# Patient Record
Sex: FEMALE | Race: NATIVE HAWAIIAN OR OTHER PACIFIC ISLANDER | HISPANIC OR LATINO | URBAN - METROPOLITAN AREA
[De-identification: names, ages, dates, MRNs, and addresses within clinical notes are randomized per-mention and may not be internally consistent; named-entity substitution may affect disease eponyms.]

---

## 2017-05-18 ENCOUNTER — OUTPATIENT (OUTPATIENT)
Dept: OUTPATIENT SERVICES | Facility: HOSPITAL | Age: 52
LOS: 1 days | End: 2017-05-18
Payer: COMMERCIAL

## 2017-05-18 VITALS
TEMPERATURE: 98 F | WEIGHT: 123.02 LBS | HEIGHT: 62 IN | OXYGEN SATURATION: 98 % | RESPIRATION RATE: 16 BRPM | DIASTOLIC BLOOD PRESSURE: 75 MMHG | SYSTOLIC BLOOD PRESSURE: 118 MMHG | HEART RATE: 68 BPM

## 2017-05-18 DIAGNOSIS — N95.0 POSTMENOPAUSAL BLEEDING: ICD-10-CM

## 2017-05-18 DIAGNOSIS — Z98.891 HISTORY OF UTERINE SCAR FROM PREVIOUS SURGERY: Chronic | ICD-10-CM

## 2017-05-18 DIAGNOSIS — Z90.49 ACQUIRED ABSENCE OF OTHER SPECIFIED PARTS OF DIGESTIVE TRACT: Chronic | ICD-10-CM

## 2017-05-18 DIAGNOSIS — Z01.818 ENCOUNTER FOR OTHER PREPROCEDURAL EXAMINATION: ICD-10-CM

## 2017-05-18 LAB
ANION GAP SERPL CALC-SCNC: 5 MMOL/L — SIGNIFICANT CHANGE UP (ref 5–17)
APTT BLD: 32.1 SEC — SIGNIFICANT CHANGE UP (ref 27.5–37.4)
BUN SERPL-MCNC: 17 MG/DL — SIGNIFICANT CHANGE UP (ref 7–18)
CALCIUM SERPL-MCNC: 9.1 MG/DL — SIGNIFICANT CHANGE UP (ref 8.4–10.5)
CHLORIDE SERPL-SCNC: 105 MMOL/L — SIGNIFICANT CHANGE UP (ref 96–108)
CO2 SERPL-SCNC: 30 MMOL/L — SIGNIFICANT CHANGE UP (ref 22–31)
CREAT SERPL-MCNC: 0.75 MG/DL — SIGNIFICANT CHANGE UP (ref 0.5–1.3)
GLUCOSE SERPL-MCNC: 97 MG/DL — SIGNIFICANT CHANGE UP (ref 70–99)
HCT VFR BLD CALC: 39.6 % — SIGNIFICANT CHANGE UP (ref 34.5–45)
HGB BLD-MCNC: 12.4 G/DL — SIGNIFICANT CHANGE UP (ref 11.5–15.5)
INR BLD: 0.96 RATIO — SIGNIFICANT CHANGE UP (ref 0.88–1.16)
MCHC RBC-ENTMCNC: 28.4 PG — SIGNIFICANT CHANGE UP (ref 27–34)
MCHC RBC-ENTMCNC: 31.2 GM/DL — LOW (ref 32–36)
MCV RBC AUTO: 91.3 FL — SIGNIFICANT CHANGE UP (ref 80–100)
PLATELET # BLD AUTO: 274 K/UL — SIGNIFICANT CHANGE UP (ref 150–400)
POTASSIUM SERPL-MCNC: 4.4 MMOL/L — SIGNIFICANT CHANGE UP (ref 3.5–5.3)
POTASSIUM SERPL-SCNC: 4.4 MMOL/L — SIGNIFICANT CHANGE UP (ref 3.5–5.3)
PROTHROM AB SERPL-ACNC: 10.4 SEC — SIGNIFICANT CHANGE UP (ref 9.8–12.7)
RBC # BLD: 4.34 M/UL — SIGNIFICANT CHANGE UP (ref 3.8–5.2)
RBC # FLD: 12.3 % — SIGNIFICANT CHANGE UP (ref 10.3–14.5)
SODIUM SERPL-SCNC: 140 MMOL/L — SIGNIFICANT CHANGE UP (ref 135–145)
WBC # BLD: 5.7 K/UL — SIGNIFICANT CHANGE UP (ref 3.8–10.5)
WBC # FLD AUTO: 5.7 K/UL — SIGNIFICANT CHANGE UP (ref 3.8–10.5)

## 2017-05-18 PROCEDURE — 85610 PROTHROMBIN TIME: CPT

## 2017-05-18 PROCEDURE — 93005 ELECTROCARDIOGRAM TRACING: CPT

## 2017-05-18 PROCEDURE — G0463: CPT

## 2017-05-18 PROCEDURE — 80048 BASIC METABOLIC PNL TOTAL CA: CPT

## 2017-05-18 PROCEDURE — 85027 COMPLETE CBC AUTOMATED: CPT

## 2017-05-18 PROCEDURE — 85730 THROMBOPLASTIN TIME PARTIAL: CPT

## 2017-05-18 NOTE — H&P PST ADULT - NSANTHOSAYNRD_GEN_A_CORE
No. ANNA screening performed.  STOP BANG Legend: 0-2 = LOW Risk; 3-4 = INTERMEDIATE Risk; 5-8 = HIGH Risk

## 2017-05-18 NOTE — H&P PST ADULT - FAMILY HISTORY
Father  Still living? Unknown  Family history of hypertension, Age at diagnosis: Age Unknown  Family history of pancreatic cancer, Age at diagnosis: Age Unknown     Mother  Still living? Unknown  Family history of diabetes mellitus, Age at diagnosis: Age Unknown  Family history of dementia, Age at diagnosis: Age Unknown

## 2017-05-18 NOTE — H&P PST ADULT - HISTORY OF PRESENT ILLNESS
51year old female with pmhx of appendicitis, hyperlipidemia and migraine headache presents today with c/o abnormal vaginal bleeding x 2weeks; LMP was 11/2014. Patient is here today for presurgical testing for scheduled dilation and curettage; possible polypectomy on 5/24/17

## 2017-05-24 ENCOUNTER — OUTPATIENT (OUTPATIENT)
Dept: OUTPATIENT SERVICES | Facility: HOSPITAL | Age: 52
LOS: 1 days | Discharge: HOME | End: 2017-05-24
Payer: COMMERCIAL

## 2017-05-24 VITALS
DIASTOLIC BLOOD PRESSURE: 79 MMHG | WEIGHT: 123.02 LBS | HEART RATE: 68 BPM | SYSTOLIC BLOOD PRESSURE: 105 MMHG | TEMPERATURE: 97 F | HEIGHT: 62 IN | RESPIRATION RATE: 14 BRPM | OXYGEN SATURATION: 100 %

## 2017-05-24 VITALS
TEMPERATURE: 98 F | DIASTOLIC BLOOD PRESSURE: 80 MMHG | SYSTOLIC BLOOD PRESSURE: 132 MMHG | HEART RATE: 65 BPM | RESPIRATION RATE: 15 BRPM | OXYGEN SATURATION: 100 %

## 2017-05-24 DIAGNOSIS — N95.0 POSTMENOPAUSAL BLEEDING: ICD-10-CM

## 2017-05-24 DIAGNOSIS — Z01.818 ENCOUNTER FOR OTHER PREPROCEDURAL EXAMINATION: ICD-10-CM

## 2017-05-24 DIAGNOSIS — Z98.891 HISTORY OF UTERINE SCAR FROM PREVIOUS SURGERY: Chronic | ICD-10-CM

## 2017-05-24 DIAGNOSIS — Z90.49 ACQUIRED ABSENCE OF OTHER SPECIFIED PARTS OF DIGESTIVE TRACT: Chronic | ICD-10-CM

## 2017-05-24 PROCEDURE — 58558 HYSTEROSCOPY BIOPSY: CPT

## 2017-05-24 PROCEDURE — 88305 TISSUE EXAM BY PATHOLOGIST: CPT | Mod: 26

## 2017-05-24 PROCEDURE — 88305 TISSUE EXAM BY PATHOLOGIST: CPT

## 2017-05-24 RX ORDER — IBUPROFEN 200 MG
1 TABLET ORAL
Qty: 40 | Refills: 1 | OUTPATIENT
Start: 2017-05-24 | End: 2017-06-12

## 2017-05-24 RX ORDER — FENTANYL CITRATE 50 UG/ML
25 INJECTION INTRAVENOUS
Qty: 0 | Refills: 0 | Status: DISCONTINUED | OUTPATIENT
Start: 2017-05-24 | End: 2017-05-24

## 2017-05-24 RX ORDER — SODIUM CHLORIDE 9 MG/ML
3 INJECTION INTRAMUSCULAR; INTRAVENOUS; SUBCUTANEOUS EVERY 8 HOURS
Qty: 0 | Refills: 0 | Status: DISCONTINUED | OUTPATIENT
Start: 2017-05-24 | End: 2017-05-24

## 2017-05-24 RX ORDER — SODIUM CHLORIDE 9 MG/ML
1000 INJECTION, SOLUTION INTRAVENOUS
Qty: 0 | Refills: 0 | Status: DISCONTINUED | OUTPATIENT
Start: 2017-05-24 | End: 2017-05-24

## 2017-05-24 RX ORDER — IBUPROFEN 200 MG
600 TABLET ORAL EVERY 6 HOURS
Qty: 0 | Refills: 0 | Status: DISCONTINUED | OUTPATIENT
Start: 2017-05-24 | End: 2017-06-01

## 2017-05-24 RX ADMIN — SODIUM CHLORIDE 3 MILLILITER(S): 9 INJECTION INTRAMUSCULAR; INTRAVENOUS; SUBCUTANEOUS at 09:24

## 2017-05-24 NOTE — ASU DISCHARGE PLAN (ADULT/PEDIATRIC). - ACTIVITY LEVEL
no intercourse/nothing per rectum/quiet play/no heavy lifting/no douching/no tampons/nothing per vagina/no tub baths

## 2017-05-24 NOTE — ASU DISCHARGE PLAN (ADULT/PEDIATRIC). - MEDICATION SUMMARY - MEDICATIONS TO TAKE
I will START or STAY ON the medications listed below when I get home from the hospital:    ibuprofen 600 mg oral tablet  -- 1 tab(s) by mouth every 6 hours, As needed, Mild pain or headache  -- Indication: For for pain control

## 2017-05-24 NOTE — ASU DISCHARGE PLAN (ADULT/PEDIATRIC). - ITEMS TO FOLLOWUP WITH YOUR PHYSICIAN'S
see dr stroud in the office as instructed by dr stroud see Dr. Short in the office as instructed by Dr. Dieter Lopez ncona

## 2017-05-24 NOTE — ASU DISCHARGE PLAN (ADULT/PEDIATRIC). - SPECIAL INSTRUCTIONS
no sex nothing in vagina no heavy lifting no pushing eat high fiber food ambulation daily as tolerated shower daily; see your gynecologist in 1-2wks for follow up

## 2017-06-01 DIAGNOSIS — N85.8 OTHER SPECIFIED NONINFLAMMATORY DISORDERS OF UTERUS: ICD-10-CM

## 2017-06-01 DIAGNOSIS — G43.909 MIGRAINE, UNSPECIFIED, NOT INTRACTABLE, WITHOUT STATUS MIGRAINOSUS: ICD-10-CM

## 2017-06-01 DIAGNOSIS — E78.5 HYPERLIPIDEMIA, UNSPECIFIED: ICD-10-CM

## 2017-06-01 DIAGNOSIS — N95.0 POSTMENOPAUSAL BLEEDING: ICD-10-CM

## 2017-06-01 DIAGNOSIS — N85.4 MALPOSITION OF UTERUS: ICD-10-CM

## 2023-02-01 NOTE — ASU PATIENT PROFILE, ADULT - PSH
Detail Level: Detailed History of appendectomy    History of   x3 Moisturizer Recommendations: Patient given samples of Epiceram to apply BID\\nPatient to use StrataCTX QHS to BID Topical Steroid Recommendations: hydrocortisone 2.5% ointment BID x 1-2 weeks as needed \\n Sunscreen Recommendations: mineral SPF 5 or above reapply every 1.5-2 hours Sunscreen Recommendations: mineral SPF 50 or greater re-apply 1.5-2 hours Nicotinamide Supplementation Recommendations: 500 mg po BID

## 2025-06-02 NOTE — H&P PST ADULT - CONSTITUTIONAL
[facility]  TRAUMA CLINIC PROGRESS NOTE    Patient Name: Cecilia Pryor  MRN: [id number]  Admit Date:   : 1972  AGE: 53 y.o.   GENDER: female  ==============================================================================  CHIEF COMPLAINT:   Acute appendicitis (2025 - 2025)  Readmitted for small fluid collections within the pelvis and inflammatory wall thickening of ileal loops and rectosigmoid colon (2025-2025)    OTHER MEDICAL PROBLEMS:  HLD    INCIDENTAL FINDINGS:  NA    PROCEDURES:  2025: Laparoscopic appendectomy.     PATHOLOGY:  FINAL DIAGNOSIS     A. APPENDIX:   -- Acute suppurative appendicitis with small transmural defect.  --Acute serositis.     ==============================================================================  TODAY'S ASSESSMENT AND PLAN OF CARE:  Wound check/post hospital check- repeat CT  reviewed with patient  Doing well, improving overall  Has several more days with augmentin  Recommended to finish course of abx  Follow up apt made in a few weeks    FOLLOW UP/CALL  - Follow up appointment trauma/ACS follow up   - May return to work or school : 06/10/25  - Return to clinic or ER sooner if pt. has any development of erythema, drainage, swelling, pain, fevers, or chills  - If you have questions or concerns that are not urgent, please feel free to call  [phone].  - Call [phone] to make additional appointment(s) as needed if unable to reschedule in office today    ==============================================================================  HISTORY OF PRESENT ILLNESS  Patient is a 53 yof patient with a past medical history of hyperlipidemia, appendicitis status post laparoscopic appendectomy on  who returned on  with worsening abdominal pain. She was admitted -. Her CT showed small fluid collections within the pelvis and inflammatory wall thickening of ileal loops and rectosigmoid colon. She was  treated non op, with IV abx and discharged home on PO Augmentin and repeat CT scan. Repeat CT scan done on 6/6.     Patient is eating, drinking, voiding and having flatus, bowel movements.     MEDICAL HISTORY / ROS:  Admission history and ROS reviewed.   Patient denies:  fevers; chills; headache;  dizziness; chest pain; shortness of breath; nausea/vomiting/diarrhea/constipation; new/worsening abdominal pain or numbness/tingling/weakness of extremities.   Pertinent changes as follows:  none    PHYSICAL EXAM:  GCS 15, A+OX3, RRR, S1, S2, CTA=, no increased WOB.  Abdomen soft , mild ttp   No rebound or guarding  MAEx4, RENARD 5/5 x4, no extremity edema noted. 2+pp.       LABS:  Results for orders placed or performed during the hospital encounter of 05/30/25 (from the past 24 hours)   Magnesium   Result Value Ref Range    Magnesium 2.12 1.60 - 2.40 mg/dL   Renal Function Panel   Result Value Ref Range    Glucose 82 74 - 99 mg/dL    Sodium 139 136 - 145 mmol/L    Potassium 3.7 3.5 - 5.3 mmol/L    Chloride 101 98 - 107 mmol/L    Bicarbonate 32 21 - 32 mmol/L    Anion Gap 10 10 - 20 mmol/L    Urea Nitrogen 8 6 - 23 mg/dL    Creatinine 0.67 0.50 - 1.05 mg/dL    eGFR >90 >60 mL/min/1.73m*2    Calcium 9.1 8.6 - 10.6 mg/dL    Phosphorus 4.2 2.5 - 4.9 mg/dL    Albumin 3.5 3.4 - 5.0 g/dL   CBC   Result Value Ref Range    WBC 8.2 4.4 - 11.3 x10*3/uL    nRBC 0.0 0.0 - 0.0 /100 WBCs    RBC 3.98 (L) 4.00 - 5.20 x10*6/uL    Hemoglobin 11.4 (L) 12.0 - 16.0 g/dL    Hematocrit 35.8 (L) 36.0 - 46.0 %    MCV 90 80 - 100 fL    MCH 28.6 26.0 - 34.0 pg    MCHC 31.8 (L) 32.0 - 36.0 g/dL    RDW 12.2 11.5 - 14.5 %    Platelets 272 150 - 450 x10*3/uL     MEDICATIONS:  Current Medications[1]    IMAGING SUMMARY:  (summary of new imaging findings, not a copy of dictation)  Repeat CT 6/6 with fluid collections not significantly changed  2 smaller collections in R pelvis have decreased in size and overall inflammatory changes have improved, there are no  new fluid collections    I have reviewed all laboratory and imaging results ordered/pertinent for today's encounter.          [1]   No current facility-administered medications for this visit.     No current outpatient medications on file.     Facility-Administered Medications Ordered in Other Visits   Medication Dose Route Frequency Provider Last Rate Last Admin    acetaminophen (Tylenol) tablet 487.5 mg  487.5 mg oral q6h Holly Kwong MD   487.5 mg at 06/02/25 0858    cholecalciferol (Vitamin D-3) tablet 25 mcg  25 mcg oral Daily Holly Kwong MD   25 mcg at 06/02/25 0858    enoxaparin (Lovenox) syringe 40 mg  40 mg subcutaneous Daily Holly Kwong MD   40 mg at 06/02/25 0858    naloxone (Narcan) injection 0.2 mg  0.2 mg intravenous q5 min PRN Holly Kwong MD        ondansetron ODT (Zofran-ODT) disintegrating tablet 4 mg  4 mg oral q8h PRN Holly Kwong MD        Or    ondansetron (Zofran) injection 4 mg  4 mg intravenous q8h PRN Holly Kwong MD        oxyCODONE (Roxicodone) immediate release tablet 10 mg  10 mg oral q6h PRN Tomy Lofton MD   10 mg at 06/01/25 2145    oxyCODONE (Roxicodone) immediate release tablet 5 mg  5 mg oral q6h PRN Tomy Lofton MD   5 mg at 06/02/25 0359    piperacillin-tazobactam (Zosyn) 3.375 g in dextrose (iso) IV 50 mL  3.375 g intravenous q6h Holly Kwong MD 0 mL/hr at 06/02/25 0643 3.375 g at 06/02/25 1156    polyethylene glycol (Glycolax, Miralax) packet 17 g  17 g oral Daily Holly Kwong MD   17 g at 05/31/25 0927    rosuvastatin (Crestor) tablet 20 mg  20 mg oral Daily Hloly Kwong MD   20 mg at 06/02/25 0858      detailed exam